# Patient Record
Sex: FEMALE | Race: WHITE
[De-identification: names, ages, dates, MRNs, and addresses within clinical notes are randomized per-mention and may not be internally consistent; named-entity substitution may affect disease eponyms.]

---

## 2018-07-08 ENCOUNTER — HOSPITAL ENCOUNTER (EMERGENCY)
Dept: HOSPITAL 61 - ER | Age: 28
Discharge: HOME | End: 2018-07-08
Payer: SELF-PAY

## 2018-07-08 DIAGNOSIS — Z98.51: ICD-10-CM

## 2018-07-08 DIAGNOSIS — F41.9: ICD-10-CM

## 2018-07-08 DIAGNOSIS — J45.909: ICD-10-CM

## 2018-07-08 DIAGNOSIS — Z88.1: ICD-10-CM

## 2018-07-08 DIAGNOSIS — N20.0: Primary | ICD-10-CM

## 2018-07-08 DIAGNOSIS — F32.9: ICD-10-CM

## 2018-07-08 LAB
ADD MAN DIFF?: NO
ALBUMIN SERPL-MCNC: 3.9 G/DL (ref 3.4–5)
ALBUMIN/GLOB SERPL: 1.2 {RATIO} (ref 1–1.7)
ALP SERPL-CCNC: 88 U/L (ref 46–116)
ALT (SGPT): 31 U/L (ref 14–59)
AMPHETAMINE/METHAMPHETAMINE: (no result)
ANION GAP SERPL CALC-SCNC: 6 MMOL/L (ref 6–14)
AST SERPL-CCNC: 20 U/L (ref 15–37)
BACTERIA,URINE: 0 /HPF
BARBITURATES: (no result)
BASO #: 0 X10^3/UL (ref 0–0.2)
BASO %: 1 % (ref 0–3)
BENZODIAZEPINES: (no result)
BILIRUBIN,URINE: NEGATIVE
BLOOD UREA NITROGEN: 15 MG/DL (ref 7–20)
BUN/CREAT SERPL: 12 (ref 6–20)
CALCIUM: 8.9 MG/DL (ref 8.5–10.1)
CANNABINOIDS: (no result)
CHLORIDE: 106 MMOL/L (ref 98–107)
CLARITY,URINE: CLEAR
CO2 SERPL-SCNC: 28 MMOL/L (ref 21–32)
COCAINE: (no result)
COLOR,URINE: YELLOW
CREAT SERPL-MCNC: 1.3 MG/DL (ref 0.6–1)
EOS #: 0.2 X10^3/UL (ref 0–0.7)
EOS %: 3 % (ref 0–3)
ETHANOL, URINE: (no result)
ETHANOL: < 10 MG/DL (ref 0–10)
GFR SERPLBLD BASED ON 1.73 SQ M-ARVRAT: 49.5 ML/MIN
GLOBULIN SER-MCNC: 3.3 G/DL (ref 2.2–3.8)
GLUCOSE SERPL-MCNC: 109 MG/DL (ref 70–99)
GLUCOSE,URINE: NEGATIVE MG/DL
HCG SERPL-ACNC: 7.5 X10^3/UL (ref 4–11)
HEMATOCRIT: 39.1 % (ref 36–47)
HEMOGLOBIN: 13.5 G/DL (ref 12–15.5)
LIPASE: 191 U/L (ref 73–393)
LYMPH #: 1.2 X10^3/UL (ref 1–4.8)
LYMPH %: 16 % (ref 24–48)
MEAN CORPUSCULAR HEMOGLOBIN: 30 PG (ref 25–35)
MEAN CORPUSCULAR HGB CONC: 35 G/DL (ref 31–37)
MEAN CORPUSCULAR VOLUME: 87 FL (ref 79–100)
METHADONE: (no result)
MONO #: 0.6 X10^3/UL (ref 0–1.1)
MONO %: 9 % (ref 0–9)
NEUT #: 5.3 X10^3UL (ref 1.8–7.7)
NEUT %: 72 % (ref 31–73)
NITRITE,URINE: NEGATIVE
OPIATES: (no result)
PH,URINE: 6
PHENCYCLIDINE: (no result)
PLATELET COUNT: 278 X10^3/UL (ref 140–400)
POTASSIUM SERPL-SCNC: 4.1 MMOL/L (ref 3.5–5.1)
PROTEIN,URINE: 30 MG/DL
RBC,URINE: (no result) /HPF (ref 0–2)
RED BLOOD COUNT: 4.52 X10^6/UL (ref 3.5–5.4)
RED CELL DISTRIBUTION WIDTH: 13.6 % (ref 11.5–14.5)
SODIUM: 140 MMOL/L (ref 136–145)
SPECIFIC GRAVITY,URINE: >=1.03
SQUAMOUS EPITHELIAL CELL,UR: (no result) /LPF
TOTAL BILIRUBIN: 0.3 MG/DL (ref 0.2–1)
TOTAL PROTEIN: 7.2 G/DL (ref 6.4–8.2)
URINE HCG POC: (no result)
UROBILINOGEN,URINE: 1 MG/DL
WBC,URINE: (no result) /HPF (ref 0–4)

## 2018-07-08 PROCEDURE — 80307 DRUG TEST PRSMV CHEM ANLYZR: CPT

## 2018-07-08 PROCEDURE — 83690 ASSAY OF LIPASE: CPT

## 2018-07-08 PROCEDURE — 81025 URINE PREGNANCY TEST: CPT

## 2018-07-08 PROCEDURE — 36415 COLL VENOUS BLD VENIPUNCTURE: CPT

## 2018-07-08 PROCEDURE — 81001 URINALYSIS AUTO W/SCOPE: CPT

## 2018-07-08 PROCEDURE — 99285 EMERGENCY DEPT VISIT HI MDM: CPT

## 2018-07-08 PROCEDURE — 74176 CT ABD & PELVIS W/O CONTRAST: CPT

## 2018-07-08 PROCEDURE — 80053 COMPREHEN METABOLIC PANEL: CPT

## 2018-07-08 PROCEDURE — 85025 COMPLETE CBC W/AUTO DIFF WBC: CPT

## 2018-07-08 PROCEDURE — 96374 THER/PROPH/DIAG INJ IV PUSH: CPT

## 2018-07-08 RX ADMIN — TAMSULOSIN HYDROCHLORIDE 1 MG: 0.4 CAPSULE ORAL at 19:35

## 2018-07-08 RX ADMIN — KETOROLAC TROMETHAMINE 1 MG: 30 INJECTION, SOLUTION INTRAMUSCULAR at 19:35

## 2019-04-11 ENCOUNTER — HOSPITAL ENCOUNTER (EMERGENCY)
Dept: HOSPITAL 61 - ER | Age: 29
Discharge: HOME | End: 2019-04-11
Payer: SELF-PAY

## 2019-04-11 VITALS — WEIGHT: 198 LBS | HEIGHT: 65 IN | BODY MASS INDEX: 32.99 KG/M2

## 2019-04-11 VITALS — DIASTOLIC BLOOD PRESSURE: 62 MMHG | SYSTOLIC BLOOD PRESSURE: 138 MMHG

## 2019-04-11 DIAGNOSIS — K59.00: Primary | ICD-10-CM

## 2019-04-11 DIAGNOSIS — Z88.1: ICD-10-CM

## 2019-04-11 DIAGNOSIS — Z98.51: ICD-10-CM

## 2019-04-11 DIAGNOSIS — F32.9: ICD-10-CM

## 2019-04-11 DIAGNOSIS — J45.909: ICD-10-CM

## 2019-04-11 DIAGNOSIS — F41.9: ICD-10-CM

## 2019-04-11 DIAGNOSIS — Z98.890: ICD-10-CM

## 2019-04-11 DIAGNOSIS — Z87.442: ICD-10-CM

## 2019-04-11 PROCEDURE — 81025 URINE PREGNANCY TEST: CPT

## 2019-04-11 PROCEDURE — 99282 EMERGENCY DEPT VISIT SF MDM: CPT

## 2019-04-11 NOTE — PHYS DOC
Past Medical History


Past Medical History:  Anxiety, Asthma, Depression, Kidney Stone, UTI


Past Surgical History:  , Tubal ligation


Additional Past Surgical Histo:  wisdom teeth extraction, kidney stent


Alcohol Use:  Occasionally


Drug Use:  None





Adult General


Chief Complaint


Chief Complaint:  CONTISPATION





[facility] [place]


HPI





Patient is a 28  year old female with history of anxiety, asthma, depression, 

who presents to the ED today to be evaluated for constipation. Patient states 

she was on a keto diet which she stopped 3 weeks ago, she states after that she 

has noted for the last 3 weeks she's had  very hard stools, she states today 

she noted blood when she wiped her rectal area. She had a hard bowel movement 

prior to this. Patient denies any nausea or vomiting. Denies any abdominal pain.





Patient refused to have a rectal exam.





We discussed strategies of managing constipation especially increasing dietary 

fiber intake as well as water intake. We discussed MiraLAX, docusate sodium, as 

well as magnesium citrate  we also talked about suppositories and enemas. 

Patient was discharged to home.





Review of Systems


Review of Systems





Constitutional: Denies fever or chills []


Eyes: Denies change in visual acuity, redness, or eye pain []


HENT: Denies nasal congestion or sore throat []


Respiratory: Denies cough or shortness of breath []


Cardiovascular: No additional information not addressed in HPI []


GI: Reports constipation, reports blood when she wiped herself. Denies 

abdominal pain, nausea, vomiting, bloody stools or diarrhea []


: Denies dysuria or hematuria []


Musculoskeletal: Denies back pain or joint pain []


Integument: Denies rash or skin lesions []


Neurologic: Denies headache, focal weakness or sensory changes []








All other systems were reviewed and found to be within normal limits, except as 

documented in this note.





Allergies


Allergies





Allergies








Coded Allergies Type Severity Reaction Last Updated Verified


 


  cefaclor Allergy Intermediate  16 Yes











Physical Exam


Physical Exam





Constitutional: Well developed, well nourished, no acute distress, non-toxic 

appearance. []


HENT: Normocephalic, atraumatic, bilateral external ears normal, oropharynx 

moist, no oral exudates, nose normal. []


Eyes: PERRLA, EOMI, conjunctiva normal, no discharge. [] 


Neck: Normal range of motion, no tenderness, supple, no stridor. [] 


Cardiovascular:Heart rate regular rhythm, no murmur []


Lungs & Thorax:  Bilateral breath sounds clear to auscultation []


Abdomen: Bowel sounds normal, soft, no tenderness, no masses, no pulsatile 

masses. [] Patient refused rectal exam.


Skin: Warm, dry, no erythema, no rash. [] 


Back: No tenderness, no CVA tenderness. [] 


Extremities: No tenderness, no cyanosis, no clubbing, ROM intact, no edema. [] 


Neurologic: Alert and oriented X 3, normal motor function, normal sensory 

function, no focal deficits noted. []


Psychologic: Affect normal, judgement normal, mood normal. []





Current Patient Data


Vital Signs





 Vital Signs








  Date Time  Temp Pulse Resp B/P (MAP) Pulse Ox O2 Delivery O2 Flow Rate FiO2


 


19 09:55 98.3 76 18 141/102 (115) 98 Room Air  





 98.3       








Lab Values





 Laboratory Tests








Test


 19


10:01


 


POC Urine HCG, Qualitative


 Hcg negative


(Negative)











EKG


EKG


[]





Radiology/Procedures


Radiology/Procedures


[]





Course & Med Decision Making


Course & Med Decision Making


Pertinent Labs and Imaging studies reviewed. (See chart for details)





See history of present illness





Dragon Disclaimer


Dragon Disclaimer


This electronic medical record was generated, in whole or in part, using a 

voice recognition dictation system.





Departure


Departure


Impression:  


 Primary Impression:  


 Constipation


Disposition:  01 HOME, SELF-CARE


Condition:  STABLE


Referrals:  


NO PCP (PCP)


follow up with your doctor in 1-2 weeks as needed


Patient Instructions:  Constipation, Adult





Additional Instructions:  


You were evaluated in the emergency room for constipation. We highly encourage 

you to increase your dietary fiber intake, increase your dietary water intake. 

Please consider taking MiraLAX and docusate/Colace for constipation. Please 

take magnesium citrate today. Consider using a suppository or enema as needed 

for constipation. Follow-up with your doctor in 1-2 weeks.





Problem Qualifiers








 Primary Impression:  


 Constipation


 Constipation type:  unspecified constipation type  Qualified Codes:  K59.00 - 

Constipation, unspecified








CANDY SCHNEIDER APRN 2019 10:26

## 2019-06-21 ENCOUNTER — HOSPITAL ENCOUNTER (EMERGENCY)
Dept: HOSPITAL 61 - ER | Age: 29
Discharge: HOME | End: 2019-06-21
Payer: SELF-PAY

## 2019-06-21 VITALS — HEIGHT: 63 IN | WEIGHT: 195 LBS | BODY MASS INDEX: 34.55 KG/M2

## 2019-06-21 VITALS — DIASTOLIC BLOOD PRESSURE: 62 MMHG | SYSTOLIC BLOOD PRESSURE: 115 MMHG

## 2019-06-21 DIAGNOSIS — Z98.51: ICD-10-CM
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
## 2019-06-21 DIAGNOSIS — F32.9: ICD-10-CM

## 2019-06-21 DIAGNOSIS — Z87.442: ICD-10-CM
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
## 2019-06-21 DIAGNOSIS — Z98.890: ICD-10-CM

## 2019-06-21 DIAGNOSIS — Z88.1: ICD-10-CM

## 2019-06-21 DIAGNOSIS — J45.909: ICD-10-CM
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
## 2019-06-21 DIAGNOSIS — S83.8X1A: Primary | ICD-10-CM

## 2019-06-21 DIAGNOSIS — F41.9: ICD-10-CM

## 2019-06-21 PROCEDURE — 99284 EMERGENCY DEPT VISIT MOD MDM: CPT

## 2019-06-21 PROCEDURE — 73562 X-RAY EXAM OF KNEE 3: CPT

## 2019-06-21 NOTE — PHYS DOC
Past Medical History


Past Medical History:  Anxiety, Asthma, Depression, Kidney Stone, UTI


Past Surgical History:  , Tubal ligation


Additional Past Surgical Histo:  wisdom teeth extraction, kidney stent


Alcohol Use:  Occasionally


Drug Use:  None





Adult General


Chief Complaint


Chief Complaint:  KNEE INJURY





HPI


HPI





Patient is a 28  year old female who presents complaining of a sharp 4 out of 10

right knee pain that began yesterday while jumping on the trampoline. Patient 

states the pain is worse on range of motion especially flexion of the knee. 

Patient denies anything specifically relieving the pain.





Review of Systems


Review of Systems





Constitutional: Denies fever or chills []


Musculoskeletal: Reports right knee pain


Integument: Denies rash or skin lesions []


Neurologic: Denies headache, focal weakness or sensory changes []








All other systems were reviewed and found to be within normal limits, except as 

documented in this note.





Allergies


Allergies





Allergies








Coded Allergies Type Severity Reaction Last Updated Verified


 


  cefaclor Allergy Intermediate  16 Yes











Physical Exam


Physical Exam





Constitutional: Well developed, well nourished, no acute distress, non-toxic 

appearance. []


Skin: Warm, dry, no erythema, no rash. [] 


Back: No tenderness, no CVA tenderness. [] 


Extremities: Right knee with no obvious gross deformity. Slight tenderness on 

palpation of the posterior aspect of the knee. No laxity, full range of motion 

to the right knee. Negative Lachman, negative Bibiana sign +2 right pedal 

pulse. Cap refill less than 2 seconds to the right toes. Sensation intact to the

 right lower extremity


Neurologic: Alert and oriented X 3, normal motor function, normal sensory 

function, no focal deficits noted. []


Psychologic: Affect normal, judgement normal, mood normal. []





Current Patient Data


Vital Signs





                                   Vital Signs








  Date Time  Temp Pulse Resp B/P (MAP) Pulse Ox O2 Delivery O2 Flow Rate FiO2


 


19 08:34 98.9 86 16 115/62 (79) 99 Room Air  





 98.9       











EKG


EKG


[]





Radiology/Procedures


Radiology/Procedures


[]PROCEDURE: KNEE RIGHT 3V





Right knee, 3 views, 2019:


 


HISTORY: Pain, trampoline injury


 


No fracture or dislocation is identified. A small sclerotic focus in the 


proximal tibia is compatible with a benign bone island. No large joint 


effusion is seen.


 


IMPRESSION: No acute bony abnormality is detected.


 


Electronically signed by: Rick Moritz, MD (2019 9:01 AM) [facility]














DICTATED and SIGNED BY:     MORITZ,RICK S MD


DATE:     19





Course & Med Decision Making


Course & Med Decision Making


Pertinent Labs and Imaging studies reviewed. (See chart for details)





This is a 28-year-old female patient presenting to the ED today with right knee 

pain that began yesterday while jumping on the trampoline. Right knee x-rays 

interpreted by radiologist are negative for any acute findings, Ace bandage 

applied to the right knee that the ED RN, neurovascular exam intact. Ice 

elevation encouraged. OTC pain relievers especially anti-inflammatories 

recommended. Follow-up with [facility] in 1-2 weeks.





Dragon Disclaimer


Dragon Disclaimer


This electronic medical record was generated, in whole or in part, using a voice

 recognition dictation system.





Departure


Departure


Impression:  


   Primary Impression:  


   Right knee sprain


Disposition:   HOME, SELF-CARE


Condition:  STABLE


Referrals:  


NO PCP (PCP)








JUSTIN MC II, MD


follow up in 1-2 weeks


Patient Instructions:  Knee Sprain, Easy-to-Read





Additional Instructions:  


You were evaluated  for right knee pain, your right knee x-rays were negative 

for any acute findings. Wear the Ace bandage provided as needed and tolerated. 

Try to ice and elevate the extremity. Take over-the-counter pain relievers 

especially anti-inflammatories as needed for pain.





Problem Qualifiers








   Primary Impression:  


   Right knee sprain


   Encounter type:  initial encounter  Involved ligament of knee:  unspecified 

   ligament  Qualified Codes:  S83.91XA - Sprain of unspecified site of right 

   knee, initial encounter








CANDY SCHNEIDER APRN              2019 09:04